# Patient Record
Sex: FEMALE | Race: WHITE | NOT HISPANIC OR LATINO | URBAN - METROPOLITAN AREA
[De-identification: names, ages, dates, MRNs, and addresses within clinical notes are randomized per-mention and may not be internally consistent; named-entity substitution may affect disease eponyms.]

---

## 2023-11-09 ENCOUNTER — TELEPHONE (OUTPATIENT)
Dept: OPHTHALMOLOGY | Facility: CLINIC | Age: 73
End: 2023-11-09
Payer: MEDICARE

## 2023-11-09 NOTE — TELEPHONE ENCOUNTER
Pt called, sts needs to reschedule her upcoming cataract surgery due to a family issue. Pt has rescheduled from 11/30/23 to 1/25/23.

## 2023-12-01 ENCOUNTER — APPOINTMENT (OUTPATIENT)
Dept: OPHTHALMOLOGY | Facility: CLINIC | Age: 73
End: 2023-12-01
Payer: MEDICARE

## 2023-12-08 ENCOUNTER — APPOINTMENT (OUTPATIENT)
Dept: OPHTHALMOLOGY | Facility: CLINIC | Age: 73
End: 2023-12-08
Payer: MEDICARE

## 2024-01-11 ASSESSMENT — EXTERNAL EXAM - LEFT EYE: OS_EXAM: NORMAL

## 2024-01-11 ASSESSMENT — EXTERNAL EXAM - RIGHT EYE: OD_EXAM: NORMAL

## 2024-01-12 ENCOUNTER — OFFICE VISIT (OUTPATIENT)
Dept: OPHTHALMOLOGY | Facility: CLINIC | Age: 74
End: 2024-01-12
Payer: MEDICARE

## 2024-01-12 DIAGNOSIS — H25.812 COMBINED FORM OF AGE-RELATED CATARACT, LEFT EYE: ICD-10-CM

## 2024-01-12 DIAGNOSIS — H52.4 PRESBYOPIA: ICD-10-CM

## 2024-01-12 DIAGNOSIS — H18.003 VORTEX KERATOPATHY OF BOTH EYES: ICD-10-CM

## 2024-01-12 DIAGNOSIS — H02.64 XANTHELASMA OF LEFT UPPER EYELID: ICD-10-CM

## 2024-01-12 DIAGNOSIS — H35.371 EPIRETINAL MEMBRANE, RIGHT EYE: ICD-10-CM

## 2024-01-12 DIAGNOSIS — H25.811 COMBINED FORM OF AGE-RELATED CATARACT, RIGHT EYE: Primary | ICD-10-CM

## 2024-01-12 DIAGNOSIS — G93.2 IIH (IDIOPATHIC INTRACRANIAL HYPERTENSION): ICD-10-CM

## 2024-01-12 DIAGNOSIS — H52.203 ASTIGMATISM OF BOTH EYES, UNSPECIFIED TYPE: ICD-10-CM

## 2024-01-12 DIAGNOSIS — H04.123 DRY EYES, BILATERAL: ICD-10-CM

## 2024-01-12 DIAGNOSIS — M35.00 H/O SJOGREN'S DISEASE (MULTI): ICD-10-CM

## 2024-01-12 DIAGNOSIS — H35.3131 EARLY DRY STAGE NONEXUDATIVE AGE-RELATED MACULAR DEGENERATION OF BOTH EYES: ICD-10-CM

## 2024-01-12 DIAGNOSIS — Z79.899 LONG-TERM USE OF HYDROXYCHLOROQUINE: ICD-10-CM

## 2024-01-12 PROCEDURE — 99214 OFFICE O/P EST MOD 30 MIN: CPT | Performed by: OPHTHALMOLOGY

## 2024-01-12 RX ORDER — POLYMYXIN B SULFATE AND TRIMETHOPRIM 1; 10000 MG/ML; [USP'U]/ML
SOLUTION OPHTHALMIC
Qty: 5 ML | Refills: 2 | Status: SHIPPED | OUTPATIENT
Start: 2024-01-12

## 2024-01-12 RX ORDER — TETRACAINE HYDROCHLORIDE 5 MG/ML
1 SOLUTION OPHTHALMIC ONCE
Status: CANCELLED | OUTPATIENT
Start: 2024-01-12 | End: 2024-01-12

## 2024-01-12 RX ORDER — PHENYLEPHRINE HYDROCHLORIDE 25 MG/ML
1 SOLUTION/ DROPS OPHTHALMIC
Status: CANCELLED | OUTPATIENT
Start: 2024-01-12 | End: 2024-01-12

## 2024-01-12 RX ORDER — CYCLOPENTOLATE HYDROCHLORIDE 10 MG/ML
1 SOLUTION/ DROPS OPHTHALMIC
Status: CANCELLED | OUTPATIENT
Start: 2024-01-12 | End: 2024-01-12

## 2024-01-12 RX ORDER — PREDNISOLONE ACETATE 10 MG/ML
SUSPENSION/ DROPS OPHTHALMIC
Qty: 5 ML | Refills: 2 | Status: SHIPPED | OUTPATIENT
Start: 2024-01-12

## 2024-01-12 ASSESSMENT — CUP TO DISC RATIO
OD_RATIO: 0.2
OS_RATIO: 0.2

## 2024-01-12 ASSESSMENT — TONOMETRY
OD_IOP_MMHG: 16
OS_IOP_MMHG: 15
IOP_METHOD: GOLDMANN APPLANATION

## 2024-01-12 ASSESSMENT — REFRACTION_WEARINGRX
OD_CYLINDER: -2.25
OD_AXIS: 095
OD_ADD: +2.50
OS_SPHERE: -0.25
OS_AXIS: 085
OS_CYLINDER: -2.25
OS_ADD: +2.50
OD_SPHERE: -0.50

## 2024-01-12 ASSESSMENT — VISUAL ACUITY
METHOD: SNELLEN - LINEAR
OS_BAT_MED: 20/80
OD_CC: 20/25
CORRECTION_TYPE: GLASSES
OS_CC: 20/80

## 2024-01-12 ASSESSMENT — REFRACTION_MANIFEST
OD_CYLINDER: -2.25
OD_SPHERE: -0.25
OD_AXIS: 085
OS_ADD: +2.50
OS_AXIS: 085
OS_CYLINDER: -2.25
OD_ADD: +2.50
OS_SPHERE: -0.25

## 2024-01-12 ASSESSMENT — ENCOUNTER SYMPTOMS: EYES NEGATIVE: 1

## 2024-01-12 NOTE — PROGRESS NOTES
Combined form of age-related cataract, right eyeH25.811  Combined form of age-related cataract, left eyeH25.812  -Visually significant cataract OS. BCVA: 20/80. Symptoms: Gradual worsening of vision over the past year. Harder to read small print. More difficulty seeing small words/numbers on TV. Having more trouble seeing road signs when driving. Glare from headlights when driving at night. A change in glasses prescription will not result in significant visual improvement at this time.  Indication/anticipated outcome for cataract surgery: To potentially improve visual acuity and improve quality of life/reduce symptoms. To obtain a better view of the retina/optic nerve. To reduce anisometropia.  Based on a comprehensive eye exam performed 09/29/23, a visually significant cataract appears to be the source of decreased vision, diminished quality of life, and impairment of activities of daily living. Discussed option of cataract surgery vs observation. Patient can no longer function adequately with current best corrected visual acuity and wishes to have cataract surgery at this time. Discussed surgical procedure with patient. Discussed potential risks, benefits, and complications of cataract surgery including but not limited to pain, bleeding, infection, inflammation, edema, increased eye pressure, retinal tear/detachment, lens dislocation, ptosis, iris damage, need for additional surgery, need for glasses after surgery, loss of vision/loss of eye. Patient understands and wishes to proceed. All questions were answered. Will schedule cataract surgery OS. Will evaluate other eye following cataract surgery OS. Lenstar done 09/29/23.   -Pentacam (9/29/23) - OD: Irregular.  46.0/47.2 @ 5.5.  OS: Regular ATR.  46.4/48.0 @ 177.7.  Discussed IOL options (standard monofocal, toric, multifocal). Lens chosen: Standard monofocal. Defer/decline toric/multifocal lens at this time. Discussed option of toric IOL, patient declines at  this time.  Aim: -0.50 to -1.00. Had thorough discussion with patient re: aim. Discussed that may potentially need glasses for best vision both at distance and at near.   Special considerations: We will plan to place 10-0 vicryl suture due to heavy lifting (has to lift adult son). Has had fentanyl before without reaction. No R.   Best contact number: [phone]  Drops:   -NO KETOROLAC DUE TO LISTED ALLERGY TO NSAIDS -  -POLYMYXIN-TRIMETHOPRIM 4x/day starting 1 day prior to surgery; Prednisolone acetate 1% 4x/day starting after surgery  **Referred by Dr. Bennett    Epiretinal membrane (ERM) of right eyeH35.371  -OCT macula (9/29/23) - SS: 7/10 OD 5/10 OS.  Normal thickness and contour OU.  Intact IS-OS OU.  No edema OU.  Mild ERM OD without significant distortion of surface.  281/282.  -Not visually significant at this time. Monitor.  We will consider referral to retina specialist in the future as needed.    Long-term use of ZayoposedQ39.899  H/O Sjogren`s japdckvT87.39  -Continue annual testing, managed by Dr. Bennett.    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    IIH (idiopathic intracranial hypertension)G93.2  -History of treatment with serial lumbar punctures.  -No active issues at this time.    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 Monitor.     Dry eyes, xwogjmhnrV23.123  -May use artificial tears as needed    Astigmatism of both eyes with lhnscagjhxU72.203  -Defer new Rx. No significant improvement in vision with refraction at this time.       No history of intraocular surgery/refractive surgery.   No FH of glaucoma  (+)AMD - mother

## 2024-01-15 RX ORDER — METOPROLOL SUCCINATE 25 MG/1
25 TABLET, EXTENDED RELEASE ORAL DAILY
COMMUNITY
End: 2024-01-15 | Stop reason: ALTCHOICE

## 2024-01-15 RX ORDER — LISINOPRIL 20 MG/1
20 TABLET ORAL DAILY
COMMUNITY

## 2024-01-15 RX ORDER — ACETAMINOPHEN 500 MG
TABLET ORAL
COMMUNITY
Start: 2014-05-10

## 2024-01-15 RX ORDER — MULTIVITAMIN
1 TABLET ORAL DAILY
COMMUNITY

## 2024-01-15 RX ORDER — HYDROXYCHLOROQUINE SULFATE 200 MG/1
1 TABLET, FILM COATED ORAL 2 TIMES DAILY
COMMUNITY
Start: 2016-02-11

## 2024-01-15 RX ORDER — ESOMEPRAZOLE MAGNESIUM 40 MG/1
40 CAPSULE, DELAYED RELEASE ORAL
COMMUNITY
Start: 2020-06-12

## 2024-01-15 RX ORDER — FLUTICASONE PROPIONATE AND SALMETEROL XINAFOATE 230; 21 UG/1; UG/1
AEROSOL, METERED RESPIRATORY (INHALATION)
COMMUNITY
Start: 2015-03-12

## 2024-01-15 RX ORDER — ACETAMINOPHEN 500 MG
2000 TABLET ORAL
COMMUNITY
End: 2024-01-15 | Stop reason: ALTCHOICE

## 2024-01-15 RX ORDER — HYDROCHLOROTHIAZIDE 25 MG/1
25 TABLET ORAL
COMMUNITY
Start: 2023-11-21

## 2024-01-15 RX ORDER — ALBUTEROL SULFATE 90 UG/1
2 AEROSOL, METERED RESPIRATORY (INHALATION) EVERY 4 HOURS PRN
COMMUNITY
Start: 2013-06-12

## 2024-01-15 RX ORDER — LIDOCAINE 50 MG/G
PATCH TOPICAL
COMMUNITY
Start: 2017-11-10 | End: 2024-01-15 | Stop reason: ALTCHOICE

## 2024-01-22 ENCOUNTER — TELEPHONE (OUTPATIENT)
Dept: OPHTHALMOLOGY | Facility: CLINIC | Age: 74
End: 2024-01-22
Payer: MEDICARE

## 2024-01-22 NOTE — TELEPHONE ENCOUNTER
Pt called, sts has a bronchial infection and coughing. Pt tested negative for COVID and Flu, and RSV. Pt has cataract surgery scheduled on 1/25/24. I explain to pt, best to reschedule, don't want her coughing on the operating room with Dr. Grove preforming cataract surgery. Pt agreed. Pt rescheduled to 2/29/24.

## 2024-01-26 ENCOUNTER — APPOINTMENT (OUTPATIENT)
Dept: OPHTHALMOLOGY | Facility: CLINIC | Age: 74
End: 2024-01-26
Payer: MEDICARE

## 2024-01-30 ENCOUNTER — APPOINTMENT (OUTPATIENT)
Dept: OPHTHALMOLOGY | Facility: CLINIC | Age: 74
End: 2024-01-30
Payer: MEDICARE

## 2024-02-28 ENCOUNTER — ANESTHESIA EVENT (OUTPATIENT)
Dept: OPERATING ROOM | Facility: CLINIC | Age: 74
End: 2024-02-28
Payer: MEDICARE

## 2024-02-29 ENCOUNTER — HOSPITAL ENCOUNTER (OUTPATIENT)
Facility: CLINIC | Age: 74
Setting detail: OUTPATIENT SURGERY
Discharge: HOME | End: 2024-02-29
Attending: OPHTHALMOLOGY | Admitting: OPHTHALMOLOGY
Payer: MEDICARE

## 2024-02-29 ENCOUNTER — ANESTHESIA (OUTPATIENT)
Dept: OPERATING ROOM | Facility: CLINIC | Age: 74
End: 2024-02-29
Payer: MEDICARE

## 2024-02-29 VITALS
DIASTOLIC BLOOD PRESSURE: 73 MMHG | WEIGHT: 174.16 LBS | OXYGEN SATURATION: 96 % | HEIGHT: 59 IN | TEMPERATURE: 98.1 F | RESPIRATION RATE: 16 BRPM | SYSTOLIC BLOOD PRESSURE: 158 MMHG | HEART RATE: 87 BPM | BODY MASS INDEX: 35.11 KG/M2

## 2024-02-29 DIAGNOSIS — H25.812 COMBINED FORM OF AGE-RELATED CATARACT, LEFT EYE: ICD-10-CM

## 2024-02-29 DIAGNOSIS — H25.811 COMBINED FORM OF AGE-RELATED CATARACT, RIGHT EYE: Primary | ICD-10-CM

## 2024-02-29 PROBLEM — G47.33 OSA (OBSTRUCTIVE SLEEP APNEA): Status: ACTIVE | Noted: 2024-02-29

## 2024-02-29 PROBLEM — I50.9 HEART FAILURE (MULTI): Status: ACTIVE | Noted: 2024-02-29

## 2024-02-29 PROBLEM — I10 PRIMARY HYPERTENSION: Status: ACTIVE | Noted: 2024-02-29

## 2024-02-29 PROBLEM — Z95.0 PACEMAKER: Status: ACTIVE | Noted: 2024-02-29

## 2024-02-29 PROBLEM — K21.9 GASTROESOPHAGEAL REFLUX DISEASE WITHOUT ESOPHAGITIS: Status: ACTIVE | Noted: 2024-02-29

## 2024-02-29 PROBLEM — M19.90 OSTEOARTHRITIS: Status: ACTIVE | Noted: 2024-02-29

## 2024-02-29 PROBLEM — J45.20 MILD INTERMITTENT ASTHMA (HHS-HCC): Status: ACTIVE | Noted: 2024-02-29

## 2024-02-29 PROCEDURE — 3600000003 HC OR TIME - INITIAL BASE CHARGE - PROCEDURE LEVEL THREE: Performed by: OPHTHALMOLOGY

## 2024-02-29 PROCEDURE — 66984 XCAPSL CTRC RMVL W/O ECP: CPT | Performed by: OPHTHALMOLOGY

## 2024-02-29 PROCEDURE — 3600000008 HC OR TIME - EACH INCREMENTAL 1 MINUTE - PROCEDURE LEVEL THREE: Performed by: OPHTHALMOLOGY

## 2024-02-29 PROCEDURE — 2500000005 HC RX 250 GENERAL PHARMACY W/O HCPCS: Performed by: OPHTHALMOLOGY

## 2024-02-29 PROCEDURE — 7100000010 HC PHASE TWO TIME - EACH INCREMENTAL 1 MINUTE: Performed by: OPHTHALMOLOGY

## 2024-02-29 PROCEDURE — 2500000001 HC RX 250 WO HCPCS SELF ADMINISTERED DRUGS (ALT 637 FOR MEDICARE OP): Performed by: OPHTHALMOLOGY

## 2024-02-29 PROCEDURE — 7100000009 HC PHASE TWO TIME - INITIAL BASE CHARGE: Performed by: OPHTHALMOLOGY

## 2024-02-29 PROCEDURE — 2500000004 HC RX 250 GENERAL PHARMACY W/ HCPCS (ALT 636 FOR OP/ED): Performed by: OPHTHALMOLOGY

## 2024-02-29 PROCEDURE — A4217 STERILE WATER/SALINE, 500 ML: HCPCS | Performed by: OPHTHALMOLOGY

## 2024-02-29 PROCEDURE — 2760000001 HC OR 276 NO HCPCS: Performed by: OPHTHALMOLOGY

## 2024-02-29 PROCEDURE — A66984 PR REMV CATARACT EXTRACAP,INSERT LENS: Performed by: ANESTHESIOLOGY

## 2024-02-29 PROCEDURE — 99100 ANES PT EXTEME AGE<1 YR&>70: CPT | Performed by: ANESTHESIOLOGY

## 2024-02-29 PROCEDURE — 3700000002 HC GENERAL ANESTHESIA TIME - EACH INCREMENTAL 1 MINUTE: Performed by: OPHTHALMOLOGY

## 2024-02-29 PROCEDURE — A66984 PR REMV CATARACT EXTRACAP,INSERT LENS: Performed by: NURSE ANESTHETIST, CERTIFIED REGISTERED

## 2024-02-29 PROCEDURE — 3700000001 HC GENERAL ANESTHESIA TIME - INITIAL BASE CHARGE: Performed by: OPHTHALMOLOGY

## 2024-02-29 PROCEDURE — 2500000004 HC RX 250 GENERAL PHARMACY W/ HCPCS (ALT 636 FOR OP/ED): Performed by: NURSE ANESTHETIST, CERTIFIED REGISTERED

## 2024-02-29 DEVICE — STERILE UV AND BLUE LIGHT FILTERING ACRYLIC FOLDABLE SINGLE-PIECE POSTERIOR CHAMBER LENSES WITH THE ULTRASERT® PRE-LOADED DELIVERY SYSTEM
Type: IMPLANTABLE DEVICE | Site: EYE | Status: FUNCTIONAL
Brand: ACRYSOF® ULTRASERT®

## 2024-02-29 RX ORDER — SODIUM CHLORIDE, SODIUM LACTATE, POTASSIUM CHLORIDE, CALCIUM CHLORIDE 600; 310; 30; 20 MG/100ML; MG/100ML; MG/100ML; MG/100ML
100 INJECTION, SOLUTION INTRAVENOUS CONTINUOUS
Status: DISCONTINUED | OUTPATIENT
Start: 2024-02-29 | End: 2024-02-29 | Stop reason: HOSPADM

## 2024-02-29 RX ORDER — ONDANSETRON HYDROCHLORIDE 2 MG/ML
4 INJECTION, SOLUTION INTRAVENOUS ONCE AS NEEDED
Status: DISCONTINUED | OUTPATIENT
Start: 2024-02-29 | End: 2024-02-29 | Stop reason: HOSPADM

## 2024-02-29 RX ORDER — LIDOCAINE HYDROCHLORIDE 10 MG/ML
INJECTION, SOLUTION EPIDURAL; INFILTRATION; INTRACAUDAL; PERINEURAL AS NEEDED
Status: DISCONTINUED | OUTPATIENT
Start: 2024-02-29 | End: 2024-02-29 | Stop reason: HOSPADM

## 2024-02-29 RX ORDER — PHENYLEPHRINE HYDROCHLORIDE 25 MG/ML
1 SOLUTION/ DROPS OPHTHALMIC
Status: COMPLETED | OUTPATIENT
Start: 2024-02-29 | End: 2024-02-29

## 2024-02-29 RX ORDER — WATER 1 ML/ML
IRRIGANT IRRIGATION AS NEEDED
Status: DISCONTINUED | OUTPATIENT
Start: 2024-02-29 | End: 2024-02-29 | Stop reason: HOSPADM

## 2024-02-29 RX ORDER — TETRACAINE HYDROCHLORIDE 5 MG/ML
SOLUTION OPHTHALMIC AS NEEDED
Status: DISCONTINUED | OUTPATIENT
Start: 2024-02-29 | End: 2024-02-29 | Stop reason: HOSPADM

## 2024-02-29 RX ORDER — CYCLOPENTOLATE HYDROCHLORIDE 10 MG/ML
1 SOLUTION/ DROPS OPHTHALMIC
Status: COMPLETED | OUTPATIENT
Start: 2024-02-29 | End: 2024-02-29

## 2024-02-29 RX ORDER — FENTANYL CITRATE 50 UG/ML
INJECTION, SOLUTION INTRAMUSCULAR; INTRAVENOUS AS NEEDED
Status: DISCONTINUED | OUTPATIENT
Start: 2024-02-29 | End: 2024-02-29

## 2024-02-29 RX ORDER — TETRACAINE HYDROCHLORIDE 5 MG/ML
1 SOLUTION OPHTHALMIC ONCE
Status: COMPLETED | OUTPATIENT
Start: 2024-02-29 | End: 2024-02-29

## 2024-02-29 RX ORDER — LIDOCAINE IN NACL,ISO-OSMOT/PF 30 MG/3 ML
0.1 SYRINGE (ML) INJECTION ONCE
Status: DISCONTINUED | OUTPATIENT
Start: 2024-02-29 | End: 2024-02-29 | Stop reason: HOSPADM

## 2024-02-29 RX ORDER — DROPERIDOL 2.5 MG/ML
0.62 INJECTION, SOLUTION INTRAMUSCULAR; INTRAVENOUS ONCE AS NEEDED
Status: DISCONTINUED | OUTPATIENT
Start: 2024-02-29 | End: 2024-02-29 | Stop reason: HOSPADM

## 2024-02-29 RX ORDER — MIDAZOLAM HYDROCHLORIDE 1 MG/ML
INJECTION, SOLUTION INTRAMUSCULAR; INTRAVENOUS AS NEEDED
Status: DISCONTINUED | OUTPATIENT
Start: 2024-02-29 | End: 2024-02-29

## 2024-02-29 RX ADMIN — FENTANYL CITRATE 25 MCG: 50 INJECTION, SOLUTION INTRAMUSCULAR; INTRAVENOUS at 12:58

## 2024-02-29 RX ADMIN — MIDAZOLAM 0.5 MG: 1 INJECTION INTRAMUSCULAR; INTRAVENOUS at 13:02

## 2024-02-29 RX ADMIN — MIDAZOLAM 1 MG: 1 INJECTION INTRAMUSCULAR; INTRAVENOUS at 12:48

## 2024-02-29 RX ADMIN — PHENYLEPHRINE HYDROCHLORIDE 1 DROP: 25 SOLUTION/ DROPS OPHTHALMIC at 11:26

## 2024-02-29 RX ADMIN — TETRACAINE HYDROCHLORIDE 1 DROP: 5 SOLUTION OPHTHALMIC at 11:26

## 2024-02-29 RX ADMIN — FENTANYL CITRATE 25 MCG: 50 INJECTION, SOLUTION INTRAMUSCULAR; INTRAVENOUS at 12:48

## 2024-02-29 RX ADMIN — MIDAZOLAM 0.5 MG: 1 INJECTION INTRAMUSCULAR; INTRAVENOUS at 13:15

## 2024-02-29 RX ADMIN — PHENYLEPHRINE HYDROCHLORIDE 1 DROP: 25 SOLUTION/ DROPS OPHTHALMIC at 11:31

## 2024-02-29 RX ADMIN — PHENYLEPHRINE HYDROCHLORIDE 1 DROP: 25 SOLUTION/ DROPS OPHTHALMIC at 11:36

## 2024-02-29 RX ADMIN — CYCLOPENTOLATE HYDROCHLORIDE 1 DROP: 10 SOLUTION/ DROPS OPHTHALMIC at 11:31

## 2024-02-29 RX ADMIN — CYCLOPENTOLATE HYDROCHLORIDE 1 DROP: 10 SOLUTION/ DROPS OPHTHALMIC at 11:36

## 2024-02-29 RX ADMIN — CYCLOPENTOLATE HYDROCHLORIDE 1 DROP: 10 SOLUTION/ DROPS OPHTHALMIC at 11:26

## 2024-02-29 SDOH — HEALTH STABILITY: MENTAL HEALTH: CURRENT SMOKER: 0

## 2024-02-29 ASSESSMENT — COLUMBIA-SUICIDE SEVERITY RATING SCALE - C-SSRS
1. IN THE PAST MONTH, HAVE YOU WISHED YOU WERE DEAD OR WISHED YOU COULD GO TO SLEEP AND NOT WAKE UP?: NO
2. HAVE YOU ACTUALLY HAD ANY THOUGHTS OF KILLING YOURSELF?: NO
6. HAVE YOU EVER DONE ANYTHING, STARTED TO DO ANYTHING, OR PREPARED TO DO ANYTHING TO END YOUR LIFE?: NO

## 2024-02-29 ASSESSMENT — PAIN SCALES - GENERAL
PAIN_LEVEL: 0
PAINLEVEL_OUTOF10: 0 - NO PAIN

## 2024-02-29 ASSESSMENT — EXTERNAL EXAM - LEFT EYE: OS_EXAM: NORMAL

## 2024-02-29 ASSESSMENT — PAIN - FUNCTIONAL ASSESSMENT
PAIN_FUNCTIONAL_ASSESSMENT: 0-10

## 2024-02-29 ASSESSMENT — EXTERNAL EXAM - RIGHT EYE: OD_EXAM: NORMAL

## 2024-02-29 NOTE — OP NOTE
Phacoemulsification Cataract with Insertion Intraocular Lens (L) Operative Note     Date: 2024  OR Location: [facility] [place] [facility] OR    Name: Hermes Lui, : 1950, Age: 73 y.o., MRN: [id number], Sex: female    Diagnosis  Pre-op Diagnosis     * Combined form of age-related cataract, left eye [H25.812] Post-op Diagnosis     * Combined form of age-related cataract, left eye [H25.812]     Procedures  Phacoemulsification Cataract with Insertion Intraocular Lens  74605 - IA XCAPSL CTRC RMVL INSJ IO LENS PROSTH W/O ECP      Surgeons      * Tia Rose - Primary    Resident/Fellow/Other Assistant:  Surgeon(s) and Role:    Procedure Summary  Anesthesia: Monitor Anesthesia Care  ASA: III  Anesthesia Staff: Anesthesiologist: Jacky Bangura MD  CRNA: Katherine Avila APRN-CRNA  SRNA: NAGA Hollingsworth  Estimated Blood Loss: 0 mL  Intra-op Medications:   Administrations occurring from 1235 to 1325 on 24:   Medication Name Total Dose   lidocaine PF (Xylocaine) 10 mg/mL (1 %) injection 1 mL   balanced salts (BSS) intraocular solution 515 mL   sterile water irrigation solution 100 mL   tetracaine (PF) 0.5 % ophthalmic solution 1 drop   chondroitin sulf-sod hyaluron (Duovisc) intraocular kit 1 mL              Anesthesia Record               Intraprocedure I/O Totals       None           Specimen: No specimens collected     Staff:   Circulator: Jillian Brown RN  Scrub Person: Madeline Munguia RN; Iris Aguilar         Drains and/or Catheters: * None in log *    Tourniquet Times:         Implants:  Implants       Type Name Action Serial No.       19.0 DIOPTER, ACRYSOF IQ UV AND BLUE LIGHT FILTERING ACRYLIC FOLDABLE SINGLE-PIECE POSTERIOR CHAMBER LENSES W/ THE ULTRASERT PRE-LOADED DELIVERY SYSTEM, MODEL  Implanted [id number]              Findings: as below    Indications: Hermes Lui is an 73 y.o. female who is having surgery for Combined form of age-related cataract, left eye [H25.812].     Procedure  Details: Patient name: Hermes Lui   YOB: 1950   MRN: [id number]   Date of surgery: February 29, 2024  Preoperative diagnosis: Combined cataract of the LEFT eye  Postoperative diagnosis: Combined cataract of the LEFT eye  Procedure: Phacoemulsification of cataract with insertion of intraocular lens, LEFT eye   Surgeon: Tia Rose MD  Resident: Mario Levy MD  Anesthesia: MAC  Complications: None  Lens Inserted: Umberto ACU0T0 with a power of +19.00 D. Serial #: [id number]. Exp date: 05/10/2025.    Procedure description: After the risks, benefits, and alternatives of the planned procedure were discussed with the patient, informed consent was obtained at the preoperative evaluation. On the day of surgery, there were no updates to the consent form and any patient questions were answered. The patient was correctly identified in the preoperative area and the LEFT eye was marked as the operative eye. Dilating drops were instilled into the LEFT eye in the preoperative area. The patient was then taken back to the operating room and placed under sedation. The patient was prepped and draped in the standard, sterile ophthalmic fashion in preparation for intraocular surgery.    A lid speculum was placed into the LEFT palpebral fissure and the operating microscope was brought into position. A paracentesis was made in inferotemporal cornea at the limbus with a 1.0mm side port blade using a cotton tipped applicator to provide countertraction. Intracameral lidocaine was injected into the anterior chamber followed by Viscoat viscoelastic. Using 0.12 forceps to stabilize the globe, a 2.4mm keratome blade was used to create a limbal clear-corneal incision superotemporally. A bent-needle cystotome and Utrata forceps were used to create a continuous curvilinear capsulorrhexis. Balanced salt saline solution on a blunt-tipped cannula was used to achieve hydrodissection.    A phacoemulsification device and a [place]  spatula were used to remove the nucleus using a divide-and-conquer technique. Residual cortical material was removed with the irrigation and aspiration handpiece. Provisc viscoelastic was then injected into the eye to reform the anterior chamber and to open the capsular bag. The posterior capsule was inspected and found to be clean and intact. The intraocular lens, Umberto ACU0T0 with a power of +19.00 diopters was injected into the capsular bag. A lens positioner was used to center the lens and ensure good position within the bag. The remaining viscoelastic was removed using irrigation and aspiration. Balanced salt saline solution on a blunt-tipped cannula was then used to hydrate the corneal stroma adjacent to the main wound and paracentesis site as well as to reform the anterior chamber. The temporal main incision was then closed with a single 10-0 vicryl suture in an interrupted fashion. The wound was checked and found to be watertight with normal intraocular pressure verified using digital palpation. At the conclusion of the case, a well-centered intraocular lens with a good red reflex was observed. Tetracaine, betadine, BSS, and prednisolone acetate drops were instilled into the LEFT eye. The lid speculum and drapes were removed. A clear plastic shield was then taped over the eye. The patient was taken to the recovery room in stable condition, having tolerated the procedure well. There were no complications.      Complications:  None; patient tolerated the procedure well.    Disposition: PACU - hemodynamically stable.  Condition: stable         Additional Details: none    Attending Attestation: I performed the procedure with resident assistance.    Tia Rose  Phone Number: [phone]

## 2024-02-29 NOTE — DISCHARGE INSTRUCTIONS
Surgeon: Tia Rose MD     Patient name: Hermes Lui    Date of surgery: February 29, 2024        DROP INSTRUCTIONS:    Prednisolone acetate 1% (pink or white cap) - One drop 4 times a day to operative eye    Polymyxin-trimethoprim (white or tan cap) - One drop 4 times a day to operative eye      When putting different drops in around the same administration time, please wait 1-2 minutes between drops  Avoid sleeping on side of operative eye  No heavy lifting over 10-15lbs and no bending over  Do not get eye wet, no eye rubbing  Wear sunglasses or glasses during the day and the shield when sleeping at night  Please call immediately if you develop any redness, pain, decreased vision, flashes, or floaters      During office hours (8:30a-4:30p): [phone]  After office hours: [phone]  [facility] : [phone]   Pager: [phone] for Dr. Grove

## 2024-02-29 NOTE — ANESTHESIA POSTPROCEDURE EVALUATION
Patient: Hermes Lui    Procedure Summary       Date: 02/29/24 [place] [facility] [place] [facility] [place] [facility] [place] [facility] [place]    Anesthesia Start: 1225 Anesthesia Stop: 1337    Procedure: Phacoemulsification Cataract with Insertion Intraocular Lens (Left: Eye) Diagnosis:       Combined form of age-related cataract, left eye      (Combined form of age-related cataract, left eye [H25.812])    Surgeons: Tia Rose MD Responsible Provider: Jacky Bangura MD    Anesthesia Type: MAC ASA Status: 3            Anesthesia Type: MAC    Vitals Value Taken Time   /108 02/29/24 1334   Temp 36.8 °C (98.2 °F) 02/29/24 1334   Pulse 81 02/29/24 1334   Resp 16 02/29/24 1334   SpO2 98 % 02/29/24 1334       Anesthesia Post Evaluation    Patient location during evaluation: PACU  Patient participation: complete - patient cannot participate  Level of consciousness: awake  Pain score: 0  Pain management: adequate  Airway patency: patent  Cardiovascular status: acceptable  Respiratory status: acceptable  Hydration status: acceptable  Postoperative Nausea and Vomiting: none        No notable events documented.

## 2024-02-29 NOTE — H&P
History Of Present Illness  Hermes Lui is a 73 y.o. female presenting with visually significant cataract OS .     Past Medical History  Past Medical History:   Diagnosis Date    Ankylosing spondylitis of unspecified sites in spine (CMS/[facility]) 02/11/2016    Ankylosing spondylitis of unspecified sites in spine    Cataract     Cerebrospinal fluid rhinorrhea     history of    CKD (chronic kidney disease)     Cranial cerebrospinal fluid leak, spontaneous     Cerebrospinal fluid rhinorrhea    Elevated C-reactive protein (CRP) 09/18/2014    Elevated C-reactive protein    Essential (primary) hypertension     Benign essential hypertension    Familial hypercholesterolemia 09/11/2014    Essential familial hypercholesterolemia    GERD (gastroesophageal reflux disease)     IIH (idiopathic intracranial hypertension)     Lattice degeneration of retina, bilateral 09/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    Bilateral retinal lattice degeneration    Other conditions influencing health status     Skin Cancer    Other conditions influencing health status 01/02/2014    Eyelids Xanthelasma    Personal history of other complications of pregnancy, childbirth and the puerperium 02/11/2016    History of miscarriage    Personal history of other diseases of the digestive system     History of esophageal reflux    Personal history of other diseases of the nervous system and sense organs     History of carpal tunnel syndrome    Personal history of other diseases of the nervous system and sense organs     History of sciatica    Personal history of other diseases of the nervous system and sense organs     History of carpal tunnel syndrome    Personal history of other diseases of the respiratory system     Personal history of asthma    Personal history of other malignant neoplasm of skin     History of squamous cell carcinoma of skin    Radial styloid tenosynovitis (de quervain) 01/18/2014    De Quervain's tenosynovitis    Rheumatoid arthritis, unspecified (CMS/[facility])      "Rheumatoid arthritis    Sjogren's disease (CMS/[facility])     Skin cancer     Sleep apnea     Unspecified asthma, uncomplicated 03/12/2015    Asthmatic bronchitis    Unspecified epiphora, unspecified side 09/11/2014    Excessive tearing       Surgical History  Past Surgical History:   Procedure Laterality Date    COLONOSCOPY      INSERT / REPLACE / REMOVE PACEMAKER      OTHER SURGICAL HISTORY  05/24/2013    Surgery    OTHER SURGICAL HISTORY  05/24/2013    Stress Test ECG Performed    OTHER SURGICAL HISTORY  07/10/2017    Umbilical Hernia Herniorrhaphy    OTHER SURGICAL HISTORY  06/12/2013    Chemosurgery (Mohs Micrographic Technique)    OTHER SURGICAL HISTORY  06/12/2013    Repair Of CSF Leak: cannot push O2 due to leak patch.    TOTAL ABDOMINAL HYSTERECTOMY  05/24/2013    Total Abdominal Hysterectomy    TOTAL ABDOMINAL HYSTERECTOMY W/ BILATERAL SALPINGOOPHORECTOMY  05/24/2013    Salpingo-oophorectomy Bilateral        Social History  She reports that she has never smoked. She has never used smokeless tobacco. She reports current alcohol use. She reports that she does not use drugs.    Family History  No family history on file.     Allergies  Celebrex [celecoxib], Ciprofloxacin hcl, Codeine, Hydrocodone, Iodinated contrast media, Levofloxacin, Morphine, Nsaids (non-steroidal anti-inflammatory drug), and Tequin [gatifloxacin]    Review of Systems   All other systems reviewed and are negative.       Physical Exam  Constitutional:       General: NAD, nontoxic appearing  Cardiovascular:      Well perfused, 2+ radial pulses b/l   Pulmonary:      No increased WOB  Psychiatric:     Appropriate affect       Last Recorded Vitals  Height 1.486 m (4' 10.5\"), weight 85.7 kg (188 lb 15 oz).    Relevant Results         Assessment/Plan   Principal Problem:    Combined form of age-related cataract, left eye      73 y.o. y/o female POH visually significant cataract OS presenting for cataract extraction and intraocular lens implantation " left eye.              Daniel Levy MD

## 2024-02-29 NOTE — ANESTHESIA PREPROCEDURE EVALUATION
Patient: Hermes Lui    Procedure Information       Date/Time: 02/29/24 1235    Procedure: Phacoemulsification Cataract with Insertion Intraocular Lens (Left: Eye)    Location: [facility] [place] [facility] OR 05 / Virtual [facility] [place] [facility]    Surgeons: Tia Rose MD            Relevant Problems   Anesthesia (within normal limits)      Cardiovascular  LBBB, Mobitz type 2 second degree heart block   (+) Pacemaker   (+) Primary hypertension      Endocrine (within normal limits)      GI   (+) Gastroesophageal reflux disease without esophagitis      /Renal (within normal limits)      Neuro/Psych   (+) IIH (idiopathic intracranial hypertension)      Pulmonary  Advair as needed for asthma, no CPAP for GENA due to prior CSF leak with patch   (+) Mild intermittent asthma   (+) GENA (obstructive sleep apnea)      GI/Hepatic  hemagioma      Hematology (within normal limits)      Musculoskeletal  Sjorgens   (+) Osteoarthritis       Clinical information reviewed:   Tobacco  Allergies  Meds   Med Hx  Surg Hx  OB Status  Fam Hx  Soc   Hx        NPO Detail:  NPO/Void Status  Date of Last Liquid: 02/29/24  Time of Last Liquid: 0900 (sip water with meds)  Date of Last Solid: 02/28/24  Time of Last Solid: 2315  Last Intake Type: Clear fluids; Food  Time of Last Void: 0900         Physical Exam    Airway  Mallampati: II  TM distance: >3 FB  Neck ROM: full     Cardiovascular - normal exam     Dental - normal exam  Comments: Crowns upper/lower   Pulmonary - normal exam     Abdominal            Anesthesia Plan    History of general anesthesia?: yes  History of complications of general anesthesia?: no    ASA 3     MAC     The patient is not a current smoker.    intravenous induction   Anesthetic plan and risks discussed with patient.  Use of blood products discussed with patient who.    Plan discussed with CRNA.

## 2024-03-01 ENCOUNTER — OFFICE VISIT (OUTPATIENT)
Dept: OPHTHALMOLOGY | Facility: CLINIC | Age: 74
End: 2024-03-01
Payer: MEDICARE

## 2024-03-01 DIAGNOSIS — H35.371 EPIRETINAL MEMBRANE, RIGHT EYE: ICD-10-CM

## 2024-03-01 DIAGNOSIS — H04.123 DRY EYES, BILATERAL: ICD-10-CM

## 2024-03-01 DIAGNOSIS — H52.4 PRESBYOPIA: ICD-10-CM

## 2024-03-01 DIAGNOSIS — H52.203 ASTIGMATISM OF BOTH EYES, UNSPECIFIED TYPE: ICD-10-CM

## 2024-03-01 DIAGNOSIS — H18.003 VORTEX KERATOPATHY OF BOTH EYES: ICD-10-CM

## 2024-03-01 DIAGNOSIS — Z96.1 PSEUDOPHAKIA: Primary | ICD-10-CM

## 2024-03-01 DIAGNOSIS — H35.3131 EARLY DRY STAGE NONEXUDATIVE AGE-RELATED MACULAR DEGENERATION OF BOTH EYES: ICD-10-CM

## 2024-03-01 DIAGNOSIS — M35.00 H/O SJOGREN'S DISEASE (MULTI): ICD-10-CM

## 2024-03-01 DIAGNOSIS — H02.64 XANTHELASMA OF LEFT UPPER EYELID: ICD-10-CM

## 2024-03-01 DIAGNOSIS — G93.2 IIH (IDIOPATHIC INTRACRANIAL HYPERTENSION): ICD-10-CM

## 2024-03-01 DIAGNOSIS — Z79.899 LONG-TERM USE OF HYDROXYCHLOROQUINE: ICD-10-CM

## 2024-03-01 DIAGNOSIS — H25.811 COMBINED FORM OF AGE-RELATED CATARACT, RIGHT EYE: ICD-10-CM

## 2024-03-01 PROCEDURE — 99024 POSTOP FOLLOW-UP VISIT: CPT | Performed by: OPHTHALMOLOGY

## 2024-03-01 ASSESSMENT — ENCOUNTER SYMPTOMS
RESPIRATORY NEGATIVE: 0
ALLERGIC/IMMUNOLOGIC NEGATIVE: 0
PSYCHIATRIC NEGATIVE: 0
NEUROLOGICAL NEGATIVE: 0
CARDIOVASCULAR NEGATIVE: 0
CONSTITUTIONAL NEGATIVE: 0
ENDOCRINE NEGATIVE: 0
HEMATOLOGIC/LYMPHATIC NEGATIVE: 0
MUSCULOSKELETAL NEGATIVE: 0
EYES NEGATIVE: 1
GASTROINTESTINAL NEGATIVE: 0

## 2024-03-01 ASSESSMENT — TONOMETRY
IOP_METHOD: GOLDMANN APPLANATION
OS_IOP_MMHG: 21

## 2024-03-01 ASSESSMENT — VISUAL ACUITY
METHOD: SNELLEN - LINEAR
OS_SC: 20/30
OS_SC+: -2

## 2024-03-01 NOTE — PATIENT INSTRUCTIONS
Surgeon: Tia Rose MD      Patient name: Hermes Lui    Date of visit: 3/1/24      left eye    Prednisolone acetate (pink or white cap) - 4 times a day    Polymyxin-trimethoprim (white or tan cap) - 4 times a day        No heavy lifting over 10-15 lbs, no bending over, do not get eye wet, no eye rubbing. Wear eye shield at bedtime and sunglasses/glasses during the day. Please call immediately if you develop any redness, pain, decreased vision, flashes, floaters.       Surgical Scheduler (during office hours): Elaine: [phone]  Office phone (after hours): [phone]  [facility] : [phone]                     Pager: [phone] for Dr. Grove

## 2024-03-01 NOTE — PROGRESS NOTES
Pseudophakia of left eyeZ96.1 - 2/29/24  -Doing well. Good vision. IOP good.  Prednisolone acetate 1% - 4 times a day  Polymyxin-trimethoprim - 4 times a day  No heavy lifting over 10-15 lbs, no bending over, do not get eye wet, no eye rubbing. Wear eye shield at bedtime and sunglasses/glasses during the day. Advised to call if any redness, pain, decreased vision, flashes, floaters. F/u 1 week - refract OU, glare/BAT right eye. Plan for dilation OU if cataract right eye is visually significant and if patient would like to proceed with cataract surgery. Okay to wait if patient prefers to defer surgery.     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 F/u 1 week - refract OU, glare/BAT right eye. Plan for dilation OU if cataract right eye is visually significant and if patient would like to proceed with cataract surgery. Okay to wait if patient prefers to defer surgery.   **Referred by Dr. Bennett    Epiretinal membrane (ERM) of right eyeH35.371  -OCT macula (9/29/23) - SS: 7/10 OD 5/10 OS.  Normal thickness and contour OU.  Intact IS-OS OU.  No edema OU.  Mild ERM OD without significant distortion of surface.  281/282.  -Not visually significant at this time. Monitor.  We will consider referral to retina specialist in the future as needed.    Long-term use of VbsirzoleQ35.899  H/O Sjogren`s ulvazapW57.39  -Continue annual testing, managed by Dr. Bennett.    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    IIH (idiopathic intracranial hypertension)G93.2  -History of treatment with serial lumbar punctures.  -No active issues at this time.    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 Monitor.     Dry eyes, ktxbrkxwyC28.123  -May use artificial tears as needed    Astigmatism of both eyes with pccrcwdadxR48.203  -F/u 1 week -  refract OU, glare/BAT right eye. Plan for dilation OU if cataract right eye is visually significant and if patient would like to proceed with cataract surgery. Okay to wait if patient prefers to defer surgery.       No history of refractive surgery.   No FH of glaucoma  (+)AMD - mother

## 2024-03-04 ASSESSMENT — EXTERNAL EXAM - RIGHT EYE: OD_EXAM: NORMAL

## 2024-03-04 ASSESSMENT — EXTERNAL EXAM - LEFT EYE: OS_EXAM: NORMAL

## 2024-03-04 ASSESSMENT — CUP TO DISC RATIO: OS_RATIO: 0.2

## 2024-03-04 NOTE — PROGRESS NOTES
Pseudophakia of left eyeZ96.1 - 2/29/24  -Doing well. Good vision. IOP good.  Prednisolone acetate 1% - 4/3/2/1 weekly taper  Polymyxin-trimethoprim - 4 times a day x 1 more week, then stop  -May use artificial tears PRN for FBS  -Yesterday, patient hit left side of eye (near lateral canthus) with cloth board  No heavy lifting over 15-20 lbs, do not get eye wet, no eye rubbing. Discontinue eye shield at bedtime but wear sunglasses/glasses during the day. Advised to call if any redness, pain, decreased vision, flashes, floaters. F/u 6-8 weeks - refract both eyes and dilate left eye. Plan for dilation OU if cataract right eye is visually significant and if patient would like to proceed with cataract surgery. Okay to wait if patient prefers to defer surgery.     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 F/u 6-8 weeks - refract both eyes and dilate left eye. Plan for dilation OU if cataract right eye is visually significant and if patient would like to proceed with cataract surgery. Okay to wait if patient prefers to defer surgery.   **Referred by Dr. Bennett    Epiretinal membrane (ERM) of right eyeH35.371  -OCT macula (9/29/23) - SS: 7/10 OD 5/10 OS.  Normal thickness and contour OU.  Intact IS-OS OU.  No edema OU.  Mild ERM OD without significant distortion of surface.  281/282.  -Not visually significant at this time. Monitor.  We will consider referral to retina specialist in the future as needed. Plan for repeat OCT macula in 6-8 months or after CEIOL OD.     Long-term use of SvvekffluC96.899  H/O Sjogren`s xkkusazP20.39  -Continue annual testing, managed by Dr. Bennett.    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    IIH (idiopathic intracranial hypertension)G93.2  -History of treatment with serial lumbar punctures.  -No active issues at this time.    Age-related  reticular degeneration of both wjsspyyI76.443  -Not visually significant at this time. Monitor.     Dry eyes, ecsvyulmjK03.123  -May use artificial tears as needed    Astigmatism of both eyes with ysatvhndttF60.203  -F/u 6-8 weeks - refract both eyes and dilate left eye. Plan for dilation OU if cataract right eye is visually significant and if patient would like to proceed with cataract surgery. Okay to wait if patient prefers to defer surgery.   -Patient prefers new Rx next visit if not proceeding with surgery OD.      No history of refractive surgery.   No FH of glaucoma  (+)AMD - mother

## 2024-03-04 NOTE — PATIENT INSTRUCTIONS
Surgeon: Tia Rose MD                   [phone]      Patient name: Hermes Lui    Date of visit: 3/5/24      left eye    Prednisolone acetate (pink or white cap) - 4 times a day for 1 week, 3 times a day for 1 week, 2 times a day for 1 week, once a day for 1 week, then stop    Polymyxin-trimethoprim (white or tan cap) - 4 times a day for 1 more week, then stop      No heavy lifting over 15-20 lbs, try not to get eye wet, no eye rubbing. You may stop wearing the eye shield at night. Please continue wearing glasses or sunglasses during the day to protect your eyes. Please call immediately if you develop any redness, pain, decreased vision, flashes, floaters.     Surgical Scheduler (during office hours) - Elaine: [phone]

## 2024-03-05 ENCOUNTER — OFFICE VISIT (OUTPATIENT)
Dept: OPHTHALMOLOGY | Facility: CLINIC | Age: 74
End: 2024-03-05
Payer: MEDICARE

## 2024-03-05 DIAGNOSIS — H18.003 VORTEX KERATOPATHY OF BOTH EYES: ICD-10-CM

## 2024-03-05 DIAGNOSIS — Z96.1 PSEUDOPHAKIA: Primary | ICD-10-CM

## 2024-03-05 DIAGNOSIS — H52.4 PRESBYOPIA: ICD-10-CM

## 2024-03-05 DIAGNOSIS — H35.371 EPIRETINAL MEMBRANE, RIGHT EYE: ICD-10-CM

## 2024-03-05 DIAGNOSIS — M35.00 H/O SJOGREN'S DISEASE (MULTI): ICD-10-CM

## 2024-03-05 DIAGNOSIS — G93.2 IIH (IDIOPATHIC INTRACRANIAL HYPERTENSION): ICD-10-CM

## 2024-03-05 DIAGNOSIS — H25.811 COMBINED FORM OF AGE-RELATED CATARACT, RIGHT EYE: ICD-10-CM

## 2024-03-05 DIAGNOSIS — H04.123 DRY EYES, BILATERAL: ICD-10-CM

## 2024-03-05 DIAGNOSIS — Z79.899 LONG-TERM USE OF HYDROXYCHLOROQUINE: ICD-10-CM

## 2024-03-05 DIAGNOSIS — H52.203 ASTIGMATISM OF BOTH EYES, UNSPECIFIED TYPE: ICD-10-CM

## 2024-03-05 DIAGNOSIS — H02.64 XANTHELASMA OF LEFT UPPER EYELID: ICD-10-CM

## 2024-03-05 DIAGNOSIS — H35.3131 EARLY DRY STAGE NONEXUDATIVE AGE-RELATED MACULAR DEGENERATION OF BOTH EYES: ICD-10-CM

## 2024-03-05 PROCEDURE — 99024 POSTOP FOLLOW-UP VISIT: CPT | Performed by: OPHTHALMOLOGY

## 2024-03-05 ASSESSMENT — TONOMETRY
IOP_METHOD: GOLDMANN APPLANATION
OS_IOP_MMHG: 14
OD_IOP_MMHG: 14

## 2024-03-05 ASSESSMENT — REFRACTION_WEARINGRX
OS_CYLINDER: -2.25
OD_SPHERE: -0.50
OD_CYLINDER: -2.25
OD_ADD: +2.50
OS_AXIS: 085
OD_AXIS: 095
OS_ADD: +2.50
OS_SPHERE: -0.25

## 2024-03-05 ASSESSMENT — ENCOUNTER SYMPTOMS
PSYCHIATRIC NEGATIVE: 0
GASTROINTESTINAL NEGATIVE: 0
NEUROLOGICAL NEGATIVE: 0
EYES NEGATIVE: 1
ENDOCRINE NEGATIVE: 0
RESPIRATORY NEGATIVE: 0
CONSTITUTIONAL NEGATIVE: 0
MUSCULOSKELETAL NEGATIVE: 0
ALLERGIC/IMMUNOLOGIC NEGATIVE: 0
HEMATOLOGIC/LYMPHATIC NEGATIVE: 0
CARDIOVASCULAR NEGATIVE: 0

## 2024-03-05 ASSESSMENT — VISUAL ACUITY
METHOD: SNELLEN - LINEAR
OD_CC: 20/20-
OS_SC: 20/60-

## 2024-03-05 ASSESSMENT — REFRACTION_MANIFEST
OD_SPHERE: -0.50
OD_AXIS: 095
OS_SPHERE: PLANO
OS_AXIS: 085
OD_ADD: +2.50
OD_CYLINDER: -2.25
OS_CYLINDER: -2.25
OS_ADD: +2.50

## 2024-04-22 ENCOUNTER — OFFICE VISIT (OUTPATIENT)
Dept: OPHTHALMOLOGY | Facility: CLINIC | Age: 74
End: 2024-04-22
Payer: MEDICARE

## 2024-04-22 DIAGNOSIS — H35.3131 EARLY DRY STAGE NONEXUDATIVE AGE-RELATED MACULAR DEGENERATION OF BOTH EYES: ICD-10-CM

## 2024-04-22 DIAGNOSIS — Z96.1 PSEUDOPHAKIA: Primary | ICD-10-CM

## 2024-04-22 DIAGNOSIS — M35.00 H/O SJOGREN'S DISEASE (MULTI): ICD-10-CM

## 2024-04-22 DIAGNOSIS — H02.64 XANTHELASMA OF LEFT UPPER EYELID: ICD-10-CM

## 2024-04-22 DIAGNOSIS — H25.811 COMBINED FORM OF AGE-RELATED CATARACT, RIGHT EYE: ICD-10-CM

## 2024-04-22 DIAGNOSIS — H04.123 DRY EYES, BILATERAL: ICD-10-CM

## 2024-04-22 DIAGNOSIS — Z79.899 LONG-TERM USE OF HYDROXYCHLOROQUINE: ICD-10-CM

## 2024-04-22 DIAGNOSIS — H52.203 ASTIGMATISM OF BOTH EYES, UNSPECIFIED TYPE: ICD-10-CM

## 2024-04-22 DIAGNOSIS — H52.4 PRESBYOPIA: ICD-10-CM

## 2024-04-22 DIAGNOSIS — H18.003 VORTEX KERATOPATHY OF BOTH EYES: ICD-10-CM

## 2024-04-22 DIAGNOSIS — H35.371 EPIRETINAL MEMBRANE, RIGHT EYE: ICD-10-CM

## 2024-04-22 DIAGNOSIS — G93.2 IIH (IDIOPATHIC INTRACRANIAL HYPERTENSION): ICD-10-CM

## 2024-04-22 PROCEDURE — 99024 POSTOP FOLLOW-UP VISIT: CPT | Performed by: OPHTHALMOLOGY

## 2024-04-22 RX ORDER — BACITRACIN ZINC AND POLYMYXIN B SULFATE 500; 10000 [USP'U]/G; [USP'U]/G
OINTMENT OPHTHALMIC NIGHTLY
Qty: 3.5 G | Refills: 0 | Status: SHIPPED | OUTPATIENT
Start: 2024-04-22 | End: 2024-04-29

## 2024-04-22 ASSESSMENT — ENCOUNTER SYMPTOMS
HEMATOLOGIC/LYMPHATIC NEGATIVE: 0
ENDOCRINE NEGATIVE: 0
RESPIRATORY NEGATIVE: 0
ALLERGIC/IMMUNOLOGIC NEGATIVE: 0
GASTROINTESTINAL NEGATIVE: 0
MUSCULOSKELETAL NEGATIVE: 0
EYES NEGATIVE: 1
NEUROLOGICAL NEGATIVE: 0
CARDIOVASCULAR NEGATIVE: 0
PSYCHIATRIC NEGATIVE: 0
CONSTITUTIONAL NEGATIVE: 0

## 2024-04-22 ASSESSMENT — VISUAL ACUITY
OS_SC: 20/50
METHOD: SNELLEN - LINEAR

## 2024-04-22 ASSESSMENT — TONOMETRY
IOP_METHOD: GOLDMANN APPLANATION
OS_IOP_MMHG: 17

## 2024-04-22 ASSESSMENT — EXTERNAL EXAM - LEFT EYE: OS_EXAM: NORMAL

## 2024-04-22 ASSESSMENT — EXTERNAL EXAM - RIGHT EYE: OD_EXAM: NORMAL

## 2024-04-22 NOTE — PROGRESS NOTES
Pseudophakia of left eyeZ96.1 - 2/29/24  -Doing well. Good vision. IOP good. Off all gtts since about 3 weeks ago (end of March).   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 Recommend observation for now if not very bothersome.   -Has had FBS and pain for the past 2 days. Eye crusted shut this morning. Artificial tears help briefly.   -10-0 vicryl suture remnant exposed - removed small exposed fragment with jeweler's forceps. No infiltrate. Discussed may have some FBS today. Recommended to continue artificial tears PRN. Use Polymyxin-trimethoprim drops OS QID x 4 days. Also will Rx: Bacitracin polymyxin ophthalmic ointment - 1/2 inch to OS QHS x 1 week. F/u 1 week as scheduled, sooner if symptoms worsen.   -At next visit - refract both eyes and dilate left eye. Plan for dilation OU if cataract right eye is visually significant and if patient would like to proceed with cataract surgery. Okay to wait if patient prefers to defer surgery.     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 F/u 1 week as scheduled - refract both eyes and dilate left eye. Plan for dilation OU if cataract right eye is visually significant and if patient would like to proceed with cataract surgery. Okay to wait if patient prefers to defer surgery.   **Referred by Dr. Bennett    Epiretinal membrane (ERM) of right eyeH35.371  -OCT macula (9/29/23) - SS: 7/10 OD 5/10 OS.  Normal thickness and contour OU.  Intact IS-OS OU.  No edema OU.  Mild ERM OD without significant distortion of surface.  281/282.  -Not visually significant at this time. Monitor.  We will consider referral to retina specialist in the future as needed. Plan for repeat OCT macula in 6-8 months or  after CEIOL OD.     Long-term use of VxiqqwxwbY33.899  H/O Sjogren`s qasfnosX08.39  -Continue annual testing, managed by Dr. Bennett.    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    IIH (idiopathic intracranial hypertension)G93.2  -History of treatment with serial lumbar punctures.  -No active issues at this time.    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 Monitor.     Dry eyes, gcrmgalweQ01.123  -May use artificial tears as needed    Astigmatism of both eyes with bfjtqpawdoM84.203  -F/u 1 week as scheduled - refract both eyes and dilate left eye. Plan for dilation OU if cataract right eye is visually significant and if patient would like to proceed with cataract surgery. Okay to wait if patient prefers to defer surgery.   -Patient prefers new Rx next visit if not proceeding with surgery OD.      No history of refractive surgery.   No FH of glaucoma  (+)AMD - mother

## 2024-04-28 ASSESSMENT — CUP TO DISC RATIO: OS_RATIO: 0.2

## 2024-04-28 ASSESSMENT — EXTERNAL EXAM - LEFT EYE: OS_EXAM: NORMAL

## 2024-04-28 ASSESSMENT — EXTERNAL EXAM - RIGHT EYE: OD_EXAM: NORMAL

## 2024-04-28 NOTE — PROGRESS NOTES
Pseudophakia of left eyeZ96.1 - 2/29/24  -Doing well. Good vision. IOP good. Off all gtts since end of March.  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 Recommend observation for now if not very bothersome.   -4/22/24 - FBS. 10-0 vicryl suture remnant exposed - removed small exposed fragment with jeweler's forceps. No infiltrate. Used Polymyxin-trimethoprim gtts and bacitracin polymyxin ophthalmic ointment - symptoms improved. May continue artificial tears PRN.     Combined form of age-related cataract, right eyeH25.811  -Good vision, will defer surgery at this time. May followup in the future when ready for cataract surgery OD.   **Referred by Dr. Bennett    Epiretinal membrane (ERM) of right eyeH35.371  -OCT macula (9/29/23) - SS: 7/10 OD 5/10 OS.  Normal thickness and contour OU.  Intact IS-OS OU.  No edema OU.  Mild ERM OD without significant distortion of surface.  281/282.  -Not visually significant at this time. Monitor.  Can onsider referral to retina specialist in the future as needed. F/u with Dr. Bennett as scheduled 9/2024.     Long-term use of SqrttmailE33.899  H/O Sjogren`s qejfvubF23.39  -Continue annual testing, managed by Dr. Bennett.    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    IIH (idiopathic intracranial hypertension)G93.2  -History of treatment with serial lumbar punctures.  -No active issues at this time.    Age-related reticular degeneration of both zcqpeleC51.443  -Not visually significant at this time. Monitor.     Dry eyes, zhntofuxuX64.123  -May use artificial tears as needed    Astigmatism of both eyes with oiedugtaisT57.203  -New Rx given per patient request.       No history of refractive surgery.   No FH of  glaucoma  (+)AMD - mother

## 2024-04-30 ENCOUNTER — OFFICE VISIT (OUTPATIENT)
Dept: OPHTHALMOLOGY | Facility: CLINIC | Age: 74
End: 2024-04-30
Payer: MEDICARE

## 2024-04-30 DIAGNOSIS — G93.2 IIH (IDIOPATHIC INTRACRANIAL HYPERTENSION): ICD-10-CM

## 2024-04-30 DIAGNOSIS — H25.811 COMBINED FORM OF AGE-RELATED CATARACT, RIGHT EYE: ICD-10-CM

## 2024-04-30 DIAGNOSIS — H52.4 PRESBYOPIA: ICD-10-CM

## 2024-04-30 DIAGNOSIS — H35.371 EPIRETINAL MEMBRANE, RIGHT EYE: ICD-10-CM

## 2024-04-30 DIAGNOSIS — H52.203 ASTIGMATISM OF BOTH EYES, UNSPECIFIED TYPE: ICD-10-CM

## 2024-04-30 DIAGNOSIS — H35.3131 EARLY DRY STAGE NONEXUDATIVE AGE-RELATED MACULAR DEGENERATION OF BOTH EYES: ICD-10-CM

## 2024-04-30 DIAGNOSIS — H04.123 DRY EYES, BILATERAL: ICD-10-CM

## 2024-04-30 DIAGNOSIS — Z79.899 LONG-TERM USE OF HYDROXYCHLOROQUINE: ICD-10-CM

## 2024-04-30 DIAGNOSIS — Z96.1 PSEUDOPHAKIA: Primary | ICD-10-CM

## 2024-04-30 DIAGNOSIS — H18.003 VORTEX KERATOPATHY OF BOTH EYES: ICD-10-CM

## 2024-04-30 DIAGNOSIS — M35.00 H/O SJOGREN'S DISEASE (MULTI): ICD-10-CM

## 2024-04-30 DIAGNOSIS — H02.64 XANTHELASMA OF LEFT UPPER EYELID: ICD-10-CM

## 2024-04-30 PROCEDURE — 99024 POSTOP FOLLOW-UP VISIT: CPT | Performed by: OPHTHALMOLOGY

## 2024-04-30 ASSESSMENT — ENCOUNTER SYMPTOMS
EYES NEGATIVE: 1
PSYCHIATRIC NEGATIVE: 0
CONSTITUTIONAL NEGATIVE: 0
HEMATOLOGIC/LYMPHATIC NEGATIVE: 0
ALLERGIC/IMMUNOLOGIC NEGATIVE: 0
MUSCULOSKELETAL NEGATIVE: 0
CARDIOVASCULAR NEGATIVE: 0
GASTROINTESTINAL NEGATIVE: 0
RESPIRATORY NEGATIVE: 0
NEUROLOGICAL NEGATIVE: 0
ENDOCRINE NEGATIVE: 0

## 2024-04-30 ASSESSMENT — REFRACTION_MANIFEST
OS_CYLINDER: -2.25
OD_ADD: +2.50
OD_AXIS: 095
OS_ADD: +2.50
OD_SPHERE: -0.50
OD_CYLINDER: -2.25
OS_AXIS: 085
OS_SPHERE: PLANO

## 2024-04-30 ASSESSMENT — REFRACTION_WEARINGRX
OD_AXIS: 095
OS_AXIS: 085
OS_ADD: +2.50
OD_ADD: +2.50
OS_SPHERE: -0.25
OS_CYLINDER: -2.25
OD_SPHERE: -0.50
OD_CYLINDER: -2.25

## 2024-04-30 ASSESSMENT — TONOMETRY
OD_IOP_MMHG: 17
IOP_METHOD: GOLDMANN APPLANATION
OS_IOP_MMHG: 15

## 2024-04-30 ASSESSMENT — VISUAL ACUITY
OS_SC: 20/80+
OD_CC: 20/25
METHOD: SNELLEN - LINEAR
OD_BAT_MED: 20/25

## 2024-09-12 ENCOUNTER — APPOINTMENT (OUTPATIENT)
Dept: OPHTHALMOLOGY | Facility: CLINIC | Age: 74
End: 2024-09-12
Payer: MEDICARE

## 2024-09-12 DIAGNOSIS — M35.00 SJOGREN'S SYNDROME, WITH UNSPECIFIED ORGAN INVOLVEMENT (MULTI): ICD-10-CM

## 2024-09-12 DIAGNOSIS — H52.203 ASTIGMATISM OF BOTH EYES WITH PRESBYOPIA: ICD-10-CM

## 2024-09-12 DIAGNOSIS — H52.4 ASTIGMATISM OF BOTH EYES WITH PRESBYOPIA: ICD-10-CM

## 2024-09-12 DIAGNOSIS — Z79.899 LONG-TERM USE OF HYDROXYCHLOROQUINE: Primary | ICD-10-CM

## 2024-09-12 PROCEDURE — 92014 COMPRE OPH EXAM EST PT 1/>: CPT | Performed by: OPTOMETRIST

## 2024-09-12 PROCEDURE — 92134 CPTRZ OPH DX IMG PST SGM RTA: CPT | Performed by: OPTOMETRIST

## 2024-09-12 PROCEDURE — 92015 DETERMINE REFRACTIVE STATE: CPT | Performed by: OPTOMETRIST

## 2024-09-12 PROCEDURE — 92083 EXTENDED VISUAL FIELD XM: CPT | Performed by: OPTOMETRIST

## 2024-09-12 ASSESSMENT — CONF VISUAL FIELD
OS_NORMAL: 1
OD_SUPERIOR_TEMPORAL_RESTRICTION: 0
OD_NORMAL: 1
OS_SUPERIOR_NASAL_RESTRICTION: 0
OD_INFERIOR_NASAL_RESTRICTION: 0
OS_INFERIOR_NASAL_RESTRICTION: 0
OD_SUPERIOR_NASAL_RESTRICTION: 0
OS_SUPERIOR_TEMPORAL_RESTRICTION: 0
OD_INFERIOR_TEMPORAL_RESTRICTION: 0
OS_INFERIOR_TEMPORAL_RESTRICTION: 0

## 2024-09-12 ASSESSMENT — CUP TO DISC RATIO
OS_RATIO: 0.2
OD_RATIO: 0.2

## 2024-09-12 ASSESSMENT — VISUAL ACUITY
OD_CC: 20/25
OS_CC+: -1
OD_CC+: -2
CORRECTION_TYPE: GLASSES
METHOD: SNELLEN - LINEAR
OS_CC: 20/20

## 2024-09-12 ASSESSMENT — REFRACTION_WEARINGRX
SPECS_TYPE: BIFOCAL
OS_ADD: +2.50
OD_CYLINDER: -2.25
OD_SPHERE: -0.50
OS_CYLINDER: -2.25
OS_SPHERE: -0.25
OS_AXIS: 085
OD_ADD: +2.50
OD_AXIS: 095

## 2024-09-12 ASSESSMENT — REFRACTION_MANIFEST
OD_SPHERE: -0.50
OD_AXIS: 095
OS_SPHERE: -0.25
OS_ADD: +2.50
OD_ADD: +2.50
OS_AXIS: 085
OS_CYLINDER: -2.25
OD_CYLINDER: -2.25

## 2024-09-12 ASSESSMENT — ENCOUNTER SYMPTOMS: EYES NEGATIVE: 1

## 2024-09-12 ASSESSMENT — TONOMETRY
IOP_METHOD: GOLDMANN APPLANATION
OS_IOP_MMHG: 16
OD_IOP_MMHG: 16

## 2024-09-12 ASSESSMENT — EXTERNAL EXAM - RIGHT EYE: OD_EXAM: NORMAL

## 2024-09-12 ASSESSMENT — EXTERNAL EXAM - LEFT EYE: OS_EXAM: NORMAL

## 2024-09-12 NOTE — PROGRESS NOTES
VA 20/25 20/20 OD/OS post PCIOL OS and stable.  Cataracts present OD.    Vortex keratopathy OU stable.  Not a cause of reduced VA.      Retina intact and no signs of plaquenil toxicity, outer retinal layers intact and foveal reflex nl.  Slight flattening of foveal contour OD and due to early epiretinal membrane.  VA excellent and no intervention required.       Sjogrens. The patient has taken 400 mg of hydroxychloroquine (Plaquenil) QD for 12 years.  Studies reveal that the risk of maculopathy when taking Plaquenil is 0% (0-5 years), 1% (over 5 years), 2% (over 10 years), and 20% cumulative, or 4% annual incidence  (over   20 years) respectively.  Annual testing with 10-2 threshold visual field perimetry, as well as spectral domain optical coherence tomography of the macula is recommended.       A Leon 10-2 threshold visual field test was done.  Results were:   OD: the absence of scotoma, pattern standard deviation 1.37 was 1.44 was 1.03 dB stable and nl   OS: the absence of scotoma, pattern standard deviation 1.25 was 1.26 was 1.10 dB stable and nl     Optical coherence tomography of the macula revealed:    OD: Normal foveal contour, slight ERM distortion, nl  photoreceptor, retinal pigment epithelium, IS/OS junction, central field 285 was 290 was 284 was 288 micron.  Vitreous hyaloid base visualized.  Findings are normal.   OS:  Normal foveal contour, photoreceptor, retinal pigment epithelium, IS/OS junction, central field 298 was 295 was 292 was 289 micron.  Vitreous hyaloid base visualized.  Findings are normal.    Mild orbital fat prolapse upper lids OU.      Mild xanthelasma of the left upper lid.  No action required.      A spectacle prescription was given to be used as needed.  Minor change.      The patient was asked to return to our clinic in one year or sooner if ocular   or vision changes occur  1 year manifest refraction (MR) BAT DFE OCT mac and 10-2.

## 2024-11-05 ENCOUNTER — APPOINTMENT (OUTPATIENT)
Dept: OPHTHALMOLOGY | Facility: CLINIC | Age: 74
End: 2024-11-05
Payer: MEDICARE

## 2025-09-24 ENCOUNTER — APPOINTMENT (OUTPATIENT)
Dept: OPHTHALMOLOGY | Facility: CLINIC | Age: 75
End: 2025-09-24
Payer: MEDICARE

## (undated) DEVICE — SOLUTION, OPHTHALMIC, TETRACAINE HCL 0.5%, 2 ML, VIAL

## (undated) DEVICE — APPLICATOR, COTTON TIP, 6 IN, 2PK, STERILE